# Patient Record
Sex: MALE | Race: WHITE | HISPANIC OR LATINO | URBAN - METROPOLITAN AREA
[De-identification: names, ages, dates, MRNs, and addresses within clinical notes are randomized per-mention and may not be internally consistent; named-entity substitution may affect disease eponyms.]

---

## 2020-01-01 ENCOUNTER — APPOINTMENT (OUTPATIENT)
Dept: RADIOLOGY | Facility: MEDICAL CENTER | Age: 50
DRG: 871 | End: 2020-01-01
Attending: EMERGENCY MEDICINE

## 2020-01-01 ENCOUNTER — APPOINTMENT (OUTPATIENT)
Dept: RADIOLOGY | Facility: MEDICAL CENTER | Age: 50
DRG: 871 | End: 2020-01-01
Attending: INTERNAL MEDICINE

## 2020-01-01 ENCOUNTER — APPOINTMENT (OUTPATIENT)
Dept: RADIOLOGY | Facility: MEDICAL CENTER | Age: 50
DRG: 871 | End: 2020-01-01
Attending: EMERGENCY MEDICINE
Payer: COMMERCIAL

## 2020-01-01 ENCOUNTER — HOSPITAL ENCOUNTER (INPATIENT)
Facility: MEDICAL CENTER | Age: 50
LOS: 1 days | DRG: 871 | End: 2020-11-28
Attending: EMERGENCY MEDICINE | Admitting: INTERNAL MEDICINE
Payer: COMMERCIAL

## 2020-01-01 VITALS
DIASTOLIC BLOOD PRESSURE: 88 MMHG | HEART RATE: 54 BPM | HEIGHT: 65 IN | WEIGHT: 180 LBS | SYSTOLIC BLOOD PRESSURE: 127 MMHG | RESPIRATION RATE: 2 BRPM | TEMPERATURE: 98 F | BODY MASS INDEX: 29.99 KG/M2 | OXYGEN SATURATION: 100 %

## 2020-01-01 DIAGNOSIS — I62.9 INTRACRANIAL HEMORRHAGE (HCC): ICD-10-CM

## 2020-01-01 DIAGNOSIS — J12.82 PNEUMONIA DUE TO COVID-19 VIRUS: ICD-10-CM

## 2020-01-01 DIAGNOSIS — U07.1 PNEUMONIA DUE TO COVID-19 VIRUS: ICD-10-CM

## 2020-01-01 LAB
ACTION RANGE TRIGGERED IACRT: YES
ALBUMIN SERPL BCP-MCNC: 4.6 G/DL (ref 3.2–4.9)
ALBUMIN/GLOB SERPL: 1.4 G/DL
ALP SERPL-CCNC: 111 U/L (ref 30–99)
ALT SERPL-CCNC: 28 U/L (ref 2–50)
AMPHET UR QL SCN: NEGATIVE
ANION GAP SERPL CALC-SCNC: 13 MMOL/L (ref 7–16)
ANION GAP SERPL CALC-SCNC: 8 MMOL/L (ref 7–16)
APAP SERPL-MCNC: <5 UG/ML (ref 10–30)
APPEARANCE UR: CLEAR
APTT PPP: 25.4 SEC (ref 24.7–36)
AST SERPL-CCNC: 37 U/L (ref 12–45)
BACTERIA #/AREA URNS HPF: NEGATIVE /HPF
BARBITURATES UR QL SCN: NEGATIVE
BASE EXCESS BLDA CALC-SCNC: -3 MMOL/L (ref -4–3)
BASOPHILS # BLD AUTO: 0.3 % (ref 0–1.8)
BASOPHILS # BLD: 0.04 K/UL (ref 0–0.12)
BENZODIAZ UR QL SCN: NEGATIVE
BILIRUB SERPL-MCNC: 0.5 MG/DL (ref 0.1–1.5)
BILIRUB UR QL STRIP.AUTO: NEGATIVE
BODY TEMPERATURE: ABNORMAL DEGREES
BUN SERPL-MCNC: 14 MG/DL (ref 8–22)
BUN SERPL-MCNC: 14 MG/DL (ref 8–22)
BZE UR QL SCN: NEGATIVE
CALCIUM SERPL-MCNC: 8.7 MG/DL (ref 8.5–10.5)
CALCIUM SERPL-MCNC: 9.2 MG/DL (ref 8.5–10.5)
CANNABINOIDS UR QL SCN: NEGATIVE
CFT BLD TEG: 2.6 MIN (ref 5–10)
CHLORIDE SERPL-SCNC: 102 MMOL/L (ref 96–112)
CHLORIDE SERPL-SCNC: 104 MMOL/L (ref 96–112)
CK SERPL-CCNC: 141 U/L (ref 0–154)
CLOT ANGLE BLD TEG: 67.9 DEGREES (ref 53–72)
CLOT LYSIS 30M P MA LENFR BLD TEG: 0 % (ref 0–8)
CO2 BLDA-SCNC: 28 MMOL/L (ref 20–33)
CO2 SERPL-SCNC: 23 MMOL/L (ref 20–33)
CO2 SERPL-SCNC: 25 MMOL/L (ref 20–33)
COLOR UR: YELLOW
COVID ORDER STATUS COVID19: NORMAL
CREAT SERPL-MCNC: 0.99 MG/DL (ref 0.5–1.4)
CREAT SERPL-MCNC: 1.04 MG/DL (ref 0.5–1.4)
CRP SERPL HS-MCNC: 0.5 MG/DL (ref 0–0.75)
CT.EXTRINSIC BLD ROTEM: 2.2 MIN (ref 1–3)
D DIMER PPP IA.FEU-MCNC: 0.67 UG/ML (FEU) (ref 0–0.5)
EKG IMPRESSION: NORMAL
EOSINOPHIL # BLD AUTO: 0.22 K/UL (ref 0–0.51)
EOSINOPHIL NFR BLD: 1.9 % (ref 0–6.9)
EPI CELLS #/AREA URNS HPF: NEGATIVE /HPF
ERYTHROCYTE [DISTWIDTH] IN BLOOD BY AUTOMATED COUNT: 42.3 FL (ref 35.9–50)
ERYTHROCYTE [DISTWIDTH] IN BLOOD BY AUTOMATED COUNT: 43.2 FL (ref 35.9–50)
ETHANOL BLD-MCNC: <10.1 MG/DL (ref 0–10)
FLUAV RNA SPEC QL NAA+PROBE: NEGATIVE
FLUBV RNA SPEC QL NAA+PROBE: NEGATIVE
GLOBULIN SER CALC-MCNC: 3.4 G/DL (ref 1.9–3.5)
GLUCOSE SERPL-MCNC: 149 MG/DL (ref 65–99)
GLUCOSE SERPL-MCNC: 226 MG/DL (ref 65–99)
GLUCOSE UR STRIP.AUTO-MCNC: NEGATIVE MG/DL
HCO3 BLDA-SCNC: 25.8 MMOL/L (ref 17–25)
HCT VFR BLD AUTO: 44.6 % (ref 42–52)
HCT VFR BLD AUTO: 48.1 % (ref 42–52)
HGB BLD-MCNC: 14.4 G/DL (ref 14–18)
HGB BLD-MCNC: 15.7 G/DL (ref 14–18)
HOROWITZ INDEX BLDA+IHG-RTO: 205 MM[HG]
HYALINE CASTS #/AREA URNS LPF: ABNORMAL /LPF
IMM GRANULOCYTES # BLD AUTO: 0.14 K/UL (ref 0–0.11)
IMM GRANULOCYTES NFR BLD AUTO: 1.2 % (ref 0–0.9)
INR PPP: 0.96 (ref 0.87–1.13)
INST. QUALIFIED PATIENT IIQPT: YES
KETONES UR STRIP.AUTO-MCNC: NEGATIVE MG/DL
LACTATE BLD-SCNC: 1.9 MMOL/L (ref 0.5–2)
LACTATE BLD-SCNC: 3.5 MMOL/L (ref 0.5–2)
LEUKOCYTE ESTERASE UR QL STRIP.AUTO: NEGATIVE
LYMPHOCYTES # BLD AUTO: 4.01 K/UL (ref 1–4.8)
LYMPHOCYTES NFR BLD: 35 % (ref 22–41)
MAGNESIUM SERPL-MCNC: 2.2 MG/DL (ref 1.5–2.5)
MCF BLD TEG: 57.7 MM (ref 50–70)
MCH RBC QN AUTO: 30.3 PG (ref 27–33)
MCH RBC QN AUTO: 31.2 PG (ref 27–33)
MCHC RBC AUTO-ENTMCNC: 32.3 G/DL (ref 33.7–35.3)
MCHC RBC AUTO-ENTMCNC: 32.6 G/DL (ref 33.7–35.3)
MCV RBC AUTO: 93.9 FL (ref 81.4–97.8)
MCV RBC AUTO: 95.6 FL (ref 81.4–97.8)
METHADONE UR QL SCN: NEGATIVE
MICRO URNS: ABNORMAL
MONOCYTES # BLD AUTO: 0.52 K/UL (ref 0–0.85)
MONOCYTES NFR BLD AUTO: 4.5 % (ref 0–13.4)
NEUTROPHILS # BLD AUTO: 6.53 K/UL (ref 1.82–7.42)
NEUTROPHILS NFR BLD: 57.1 % (ref 44–72)
NITRITE UR QL STRIP.AUTO: NEGATIVE
NRBC # BLD AUTO: 0 K/UL
NRBC BLD-RTO: 0 /100 WBC
NT-PROBNP SERPL IA-MCNC: 59 PG/ML (ref 0–125)
O2/TOTAL GAS SETTING VFR VENT: 100 %
OPIATES UR QL SCN: NEGATIVE
OXYCODONE UR QL SCN: NEGATIVE
PCO2 BLDA: 62.9 MMHG (ref 26–37)
PCP UR QL SCN: NEGATIVE
PH BLDA: 7.22 [PH] (ref 7.4–7.5)
PH UR STRIP.AUTO: 7.5 [PH] (ref 5–8)
PLATELET # BLD AUTO: 262 K/UL (ref 164–446)
PLATELET # BLD AUTO: 266 K/UL (ref 164–446)
PMV BLD AUTO: 10.4 FL (ref 9–12.9)
PMV BLD AUTO: 9.5 FL (ref 9–12.9)
PO2 BLDA: 205 MMHG (ref 64–87)
POTASSIUM SERPL-SCNC: 2.9 MMOL/L (ref 3.6–5.5)
POTASSIUM SERPL-SCNC: 4.4 MMOL/L (ref 3.6–5.5)
POTASSIUM SERPL-SCNC: 5.9 MMOL/L (ref 3.6–5.5)
PROCALCITONIN SERPL-MCNC: <0.05 NG/ML
PROPOXYPH UR QL SCN: NEGATIVE
PROT SERPL-MCNC: 8 G/DL (ref 6–8.2)
PROT UR QL STRIP: 30 MG/DL
PROTHROMBIN TIME: 13.1 SEC (ref 12–14.6)
RBC # BLD AUTO: 4.75 M/UL (ref 4.7–6.1)
RBC # BLD AUTO: 5.03 M/UL (ref 4.7–6.1)
RBC # URNS HPF: ABNORMAL /HPF
RBC UR QL AUTO: ABNORMAL
RSV RNA SPEC QL NAA+PROBE: NEGATIVE
SALICYLATES SERPL-MCNC: <1 MG/DL (ref 15–25)
SAO2 % BLDA: 100 % (ref 93–99)
SARS-COV-2 RNA RESP QL NAA+PROBE: DETECTED
SODIUM SERPL-SCNC: 137 MMOL/L (ref 135–145)
SODIUM SERPL-SCNC: 138 MMOL/L (ref 135–145)
SP GR UR STRIP.AUTO: 1.01
SPECIMEN DRAWN FROM PATIENT: ABNORMAL
SPECIMEN SOURCE: ABNORMAL
T4 FREE SERPL-MCNC: 1.15 NG/DL (ref 0.93–1.7)
TEG ALGORITHM TGALG: ABNORMAL
TRIGL SERPL-MCNC: 212 MG/DL (ref 0–149)
TROPONIN T SERPL-MCNC: 8 NG/L (ref 6–19)
TSH SERPL DL<=0.005 MIU/L-ACNC: 3.5 UIU/ML (ref 0.38–5.33)
UROBILINOGEN UR STRIP.AUTO-MCNC: 0.2 MG/DL
WBC # BLD AUTO: 11.5 K/UL (ref 4.8–10.8)
WBC # BLD AUTO: 14.8 K/UL (ref 4.8–10.8)
WBC #/AREA URNS HPF: ABNORMAL /HPF

## 2020-01-01 PROCEDURE — 80048 BASIC METABOLIC PNL TOTAL CA: CPT

## 2020-01-01 PROCEDURE — 5A1935Z RESPIRATORY VENTILATION, LESS THAN 24 CONSECUTIVE HOURS: ICD-10-PCS | Performed by: EMERGENCY MEDICINE

## 2020-01-01 PROCEDURE — 85025 COMPLETE CBC W/AUTO DIFF WBC: CPT

## 2020-01-01 PROCEDURE — 84443 ASSAY THYROID STIM HORMONE: CPT

## 2020-01-01 PROCEDURE — 85576 BLOOD PLATELET AGGREGATION: CPT

## 2020-01-01 PROCEDURE — 700111 HCHG RX REV CODE 636 W/ 250 OVERRIDE (IP): Performed by: INTERNAL MEDICINE

## 2020-01-01 PROCEDURE — 84478 ASSAY OF TRIGLYCERIDES: CPT

## 2020-01-01 PROCEDURE — 96365 THER/PROPH/DIAG IV INF INIT: CPT

## 2020-01-01 PROCEDURE — 85730 THROMBOPLASTIN TIME PARTIAL: CPT

## 2020-01-01 PROCEDURE — 87150 DNA/RNA AMPLIFIED PROBE: CPT | Mod: 91

## 2020-01-01 PROCEDURE — 99292 CRITICAL CARE ADDL 30 MIN: CPT | Performed by: INTERNAL MEDICINE

## 2020-01-01 PROCEDURE — 302140 GU CART

## 2020-01-01 PROCEDURE — 86140 C-REACTIVE PROTEIN: CPT

## 2020-01-01 PROCEDURE — 96366 THER/PROPH/DIAG IV INF ADDON: CPT

## 2020-01-01 PROCEDURE — 80053 COMPREHEN METABOLIC PANEL: CPT

## 2020-01-01 PROCEDURE — 96367 TX/PROPH/DG ADDL SEQ IV INF: CPT

## 2020-01-01 PROCEDURE — 84132 ASSAY OF SERUM POTASSIUM: CPT

## 2020-01-01 PROCEDURE — 96375 TX/PRO/DX INJ NEW DRUG ADDON: CPT

## 2020-01-01 PROCEDURE — 81001 URINALYSIS AUTO W/SCOPE: CPT

## 2020-01-01 PROCEDURE — 87040 BLOOD CULTURE FOR BACTERIA: CPT | Mod: 91

## 2020-01-01 PROCEDURE — 83735 ASSAY OF MAGNESIUM: CPT

## 2020-01-01 PROCEDURE — 770022 HCHG ROOM/CARE - ICU (200)

## 2020-01-01 PROCEDURE — 94002 VENT MGMT INPAT INIT DAY: CPT

## 2020-01-01 PROCEDURE — 85347 COAGULATION TIME ACTIVATED: CPT

## 2020-01-01 PROCEDURE — 87086 URINE CULTURE/COLONY COUNT: CPT

## 2020-01-01 PROCEDURE — 700111 HCHG RX REV CODE 636 W/ 250 OVERRIDE (IP): Performed by: EMERGENCY MEDICINE

## 2020-01-01 PROCEDURE — 94760 N-INVAS EAR/PLS OXIMETRY 1: CPT

## 2020-01-01 PROCEDURE — 87077 CULTURE AEROBIC IDENTIFY: CPT

## 2020-01-01 PROCEDURE — 93005 ELECTROCARDIOGRAM TRACING: CPT | Performed by: EMERGENCY MEDICINE

## 2020-01-01 PROCEDURE — 82803 BLOOD GASES ANY COMBINATION: CPT

## 2020-01-01 PROCEDURE — 80307 DRUG TEST PRSMV CHEM ANLYZR: CPT

## 2020-01-01 PROCEDURE — 83880 ASSAY OF NATRIURETIC PEPTIDE: CPT

## 2020-01-01 PROCEDURE — 96368 THER/DIAG CONCURRENT INF: CPT

## 2020-01-01 PROCEDURE — 99291 CRITICAL CARE FIRST HOUR: CPT

## 2020-01-01 PROCEDURE — 84484 ASSAY OF TROPONIN QUANT: CPT

## 2020-01-01 PROCEDURE — 84145 PROCALCITONIN (PCT): CPT

## 2020-01-01 PROCEDURE — 51102 DRAIN BL W/CATH INSERTION: CPT

## 2020-01-01 PROCEDURE — 51798 US URINE CAPACITY MEASURE: CPT

## 2020-01-01 PROCEDURE — 0T9B30Z DRAINAGE OF BLADDER WITH DRAINAGE DEVICE, PERCUTANEOUS APPROACH: ICD-10-PCS | Performed by: UROLOGY

## 2020-01-01 PROCEDURE — 99291 CRITICAL CARE FIRST HOUR: CPT | Performed by: INTERNAL MEDICINE

## 2020-01-01 PROCEDURE — 82550 ASSAY OF CK (CPK): CPT

## 2020-01-01 PROCEDURE — 0BH17EZ INSERTION OF ENDOTRACHEAL AIRWAY INTO TRACHEA, VIA NATURAL OR ARTIFICIAL OPENING: ICD-10-PCS | Performed by: EMERGENCY MEDICINE

## 2020-01-01 PROCEDURE — 94003 VENT MGMT INPAT SUBQ DAY: CPT

## 2020-01-01 PROCEDURE — 87186 SC STD MICRODIL/AGAR DIL: CPT

## 2020-01-01 PROCEDURE — 0TJ98ZZ INSPECTION OF URETER, VIA NATURAL OR ARTIFICIAL OPENING ENDOSCOPIC: ICD-10-PCS | Performed by: UROLOGY

## 2020-01-01 PROCEDURE — 85384 FIBRINOGEN ACTIVITY: CPT

## 2020-01-01 PROCEDURE — 700105 HCHG RX REV CODE 258: Performed by: EMERGENCY MEDICINE

## 2020-01-01 PROCEDURE — 0241U HCHG SARS-COV-2 COVID-19 NFCT DS RESP RNA 4 TRGT MIC: CPT

## 2020-01-01 PROCEDURE — 85379 FIBRIN DEGRADATION QUANT: CPT

## 2020-01-01 PROCEDURE — 70450 CT HEAD/BRAIN W/O DYE: CPT

## 2020-01-01 PROCEDURE — 85610 PROTHROMBIN TIME: CPT

## 2020-01-01 PROCEDURE — 700101 HCHG RX REV CODE 250: Performed by: EMERGENCY MEDICINE

## 2020-01-01 PROCEDURE — 83605 ASSAY OF LACTIC ACID: CPT | Mod: 91

## 2020-01-01 PROCEDURE — 31500 INSERT EMERGENCY AIRWAY: CPT

## 2020-01-01 PROCEDURE — 304538 HCHG NG TUBE

## 2020-01-01 PROCEDURE — 71045 X-RAY EXAM CHEST 1 VIEW: CPT

## 2020-01-01 PROCEDURE — 84439 ASSAY OF FREE THYROXINE: CPT

## 2020-01-01 PROCEDURE — 85027 COMPLETE CBC AUTOMATED: CPT

## 2020-01-01 RX ORDER — SODIUM CHLORIDE, SODIUM LACTATE, POTASSIUM CHLORIDE, CALCIUM CHLORIDE 600; 310; 30; 20 MG/100ML; MG/100ML; MG/100ML; MG/100ML
1000 INJECTION, SOLUTION INTRAVENOUS ONCE
Status: COMPLETED | OUTPATIENT
Start: 2020-01-01 | End: 2020-01-01

## 2020-01-01 RX ORDER — FAMOTIDINE 20 MG/1
20 TABLET, FILM COATED ORAL EVERY 12 HOURS
Status: DISCONTINUED | OUTPATIENT
Start: 2020-01-01 | End: 2020-01-01

## 2020-01-01 RX ORDER — ACETAMINOPHEN 325 MG/1
650 TABLET ORAL EVERY 6 HOURS PRN
Status: DISCONTINUED | OUTPATIENT
Start: 2020-01-01 | End: 2020-01-01

## 2020-01-01 RX ORDER — IPRATROPIUM BROMIDE AND ALBUTEROL SULFATE 2.5; .5 MG/3ML; MG/3ML
3 SOLUTION RESPIRATORY (INHALATION)
Status: DISCONTINUED | OUTPATIENT
Start: 2020-01-01 | End: 2020-01-01

## 2020-01-01 RX ORDER — ONDANSETRON 4 MG/1
4 TABLET, ORALLY DISINTEGRATING ORAL EVERY 6 HOURS PRN
Status: DISCONTINUED | OUTPATIENT
Start: 2020-01-01 | End: 2020-01-01

## 2020-01-01 RX ORDER — HYDROMORPHONE HYDROCHLORIDE 2 MG/ML
4 INJECTION, SOLUTION INTRAMUSCULAR; INTRAVENOUS; SUBCUTANEOUS
Status: DISCONTINUED | OUTPATIENT
Start: 2020-01-01 | End: 2020-01-01 | Stop reason: HOSPADM

## 2020-01-01 RX ORDER — ONDANSETRON 2 MG/ML
4 INJECTION INTRAMUSCULAR; INTRAVENOUS EVERY 6 HOURS PRN
Status: DISCONTINUED | OUTPATIENT
Start: 2020-01-01 | End: 2020-01-01

## 2020-01-01 RX ORDER — FUROSEMIDE 10 MG/ML
40 INJECTION INTRAMUSCULAR; INTRAVENOUS
Status: DISCONTINUED | OUTPATIENT
Start: 2020-01-01 | End: 2020-01-01

## 2020-01-01 RX ORDER — LORAZEPAM 2 MG/ML
1 CONCENTRATE ORAL
Status: DISCONTINUED | OUTPATIENT
Start: 2020-01-01 | End: 2020-01-01 | Stop reason: HOSPADM

## 2020-01-01 RX ORDER — ATROPINE SULFATE 10 MG/ML
2 SOLUTION/ DROPS OPHTHALMIC EVERY 4 HOURS PRN
Status: DISCONTINUED | OUTPATIENT
Start: 2020-01-01 | End: 2020-01-01 | Stop reason: HOSPADM

## 2020-01-01 RX ORDER — LORAZEPAM 2 MG/ML
1 INJECTION INTRAMUSCULAR
Status: DISCONTINUED | OUTPATIENT
Start: 2020-01-01 | End: 2020-01-01 | Stop reason: HOSPADM

## 2020-01-01 RX ORDER — AMOXICILLIN 250 MG
2 CAPSULE ORAL 2 TIMES DAILY
Status: DISCONTINUED | OUTPATIENT
Start: 2020-01-01 | End: 2020-01-01

## 2020-01-01 RX ORDER — SODIUM CHLORIDE, SODIUM LACTATE, POTASSIUM CHLORIDE, CALCIUM CHLORIDE 600; 310; 30; 20 MG/100ML; MG/100ML; MG/100ML; MG/100ML
1000 INJECTION, SOLUTION INTRAVENOUS ONCE
Status: DISCONTINUED | OUTPATIENT
Start: 2020-01-01 | End: 2020-01-01

## 2020-01-01 RX ORDER — KETAMINE HYDROCHLORIDE 50 MG/ML
INJECTION, SOLUTION INTRAMUSCULAR; INTRAVENOUS
Status: COMPLETED | OUTPATIENT
Start: 2020-01-01 | End: 2020-01-01

## 2020-01-01 RX ORDER — POTASSIUM CHLORIDE 7.45 MG/ML
10 INJECTION INTRAVENOUS
Status: COMPLETED | OUTPATIENT
Start: 2020-01-01 | End: 2020-01-01

## 2020-01-01 RX ORDER — DEXTROSE MONOHYDRATE 25 G/50ML
50 INJECTION, SOLUTION INTRAVENOUS
Status: DISCONTINUED | OUTPATIENT
Start: 2020-01-01 | End: 2020-01-01

## 2020-01-01 RX ORDER — BISACODYL 10 MG
10 SUPPOSITORY, RECTAL RECTAL
Status: DISCONTINUED | OUTPATIENT
Start: 2020-01-01 | End: 2020-01-01

## 2020-01-01 RX ORDER — POLYETHYLENE GLYCOL 3350 17 G/17G
1 POWDER, FOR SOLUTION ORAL
Status: DISCONTINUED | OUTPATIENT
Start: 2020-01-01 | End: 2020-01-01

## 2020-01-01 RX ORDER — LABETALOL HYDROCHLORIDE 5 MG/ML
10 INJECTION, SOLUTION INTRAVENOUS EVERY 4 HOURS PRN
Status: DISCONTINUED | OUTPATIENT
Start: 2020-01-01 | End: 2020-01-01

## 2020-01-01 RX ORDER — HYDROMORPHONE HYDROCHLORIDE 2 MG/ML
2 INJECTION, SOLUTION INTRAMUSCULAR; INTRAVENOUS; SUBCUTANEOUS
Status: DISCONTINUED | OUTPATIENT
Start: 2020-01-01 | End: 2020-01-01 | Stop reason: HOSPADM

## 2020-01-01 RX ORDER — ROCURONIUM BROMIDE 10 MG/ML
INJECTION, SOLUTION INTRAVENOUS
Status: COMPLETED | OUTPATIENT
Start: 2020-01-01 | End: 2020-01-01

## 2020-01-01 RX ORDER — DEXAMETHASONE 6 MG/1
6 TABLET ORAL EVERY EVENING
Status: DISCONTINUED | OUTPATIENT
Start: 2020-01-01 | End: 2020-01-01

## 2020-01-01 RX ADMIN — PROPOFOL 25 MCG/KG/MIN: 10 INJECTION, EMULSION INTRAVENOUS at 19:29

## 2020-01-01 RX ADMIN — PROPOFOL 25 MCG/KG/MIN: 10 INJECTION, EMULSION INTRAVENOUS at 15:15

## 2020-01-01 RX ADMIN — FUROSEMIDE 40 MG: 10 INJECTION, SOLUTION INTRAMUSCULAR; INTRAVENOUS at 19:31

## 2020-01-01 RX ADMIN — SODIUM CHLORIDE, POTASSIUM CHLORIDE, SODIUM LACTATE AND CALCIUM CHLORIDE 1000 ML: 600; 310; 30; 20 INJECTION, SOLUTION INTRAVENOUS at 14:35

## 2020-01-01 RX ADMIN — FENTANYL CITRATE 100 MCG: 50 INJECTION, SOLUTION INTRAMUSCULAR; INTRAVENOUS at 16:42

## 2020-01-01 RX ADMIN — ROCURONIUM BROMIDE 100 MG: 10 INJECTION, SOLUTION INTRAVENOUS at 14:16

## 2020-01-01 RX ADMIN — POTASSIUM CHLORIDE 10 MEQ: 7.46 INJECTION, SOLUTION INTRAVENOUS at 19:27

## 2020-01-01 RX ADMIN — FENTANYL CITRATE 100 MCG: 50 INJECTION, SOLUTION INTRAMUSCULAR; INTRAVENOUS at 18:27

## 2020-01-01 RX ADMIN — KETAMINE HYDROCHLORIDE 100 MG: 50 INJECTION INTRAMUSCULAR; INTRAVENOUS at 14:16

## 2020-01-01 RX ADMIN — FAMOTIDINE 20 MG: 10 INJECTION INTRAVENOUS at 17:33

## 2020-01-01 RX ADMIN — POTASSIUM CHLORIDE 10 MEQ: 7.46 INJECTION, SOLUTION INTRAVENOUS at 18:29

## 2020-01-01 RX ADMIN — CEFTRIAXONE SODIUM 2 G: 2 INJECTION, POWDER, FOR SOLUTION INTRAMUSCULAR; INTRAVENOUS at 17:33

## 2020-01-01 RX ADMIN — POTASSIUM CHLORIDE 10 MEQ: 7.46 INJECTION, SOLUTION INTRAVENOUS at 17:35

## 2020-01-01 RX ADMIN — POTASSIUM CHLORIDE 10 MEQ: 7.46 INJECTION, SOLUTION INTRAVENOUS at 20:42

## 2020-01-01 RX ADMIN — PROPOFOL 25 MCG/KG/MIN: 10 INJECTION, EMULSION INTRAVENOUS at 14:35

## 2020-11-27 PROBLEM — I62.9 INTRACRANIAL HEMORRHAGE (HCC): Status: ACTIVE | Noted: 2020-01-01

## 2020-11-27 PROBLEM — U07.1 PNEUMONIA DUE TO COVID-19 VIRUS: Status: ACTIVE | Noted: 2020-01-01

## 2020-11-27 PROBLEM — R31.9 HEMATURIA: Status: ACTIVE | Noted: 2020-01-01

## 2020-11-27 PROBLEM — R11.10 VOMITING: Status: ACTIVE | Noted: 2020-01-01

## 2020-11-27 PROBLEM — T17.908A ASPIRATION INTO AIRWAY: Status: ACTIVE | Noted: 2020-01-01

## 2020-11-27 PROBLEM — R41.82 ALTERED MENTAL STATUS: Status: ACTIVE | Noted: 2020-01-01

## 2020-11-27 PROBLEM — A41.9 SEPSIS (HCC): Status: ACTIVE | Noted: 2020-01-01

## 2020-11-27 PROBLEM — J96.01 ACUTE RESPIRATORY FAILURE WITH HYPOXIA (HCC): Status: ACTIVE | Noted: 2020-01-01

## 2020-11-27 PROBLEM — E87.6 HYPOKALEMIA: Status: ACTIVE | Noted: 2020-01-01

## 2020-11-27 PROBLEM — J12.82 PNEUMONIA DUE TO COVID-19 VIRUS: Status: ACTIVE | Noted: 2020-01-01

## 2020-11-27 NOTE — ASSESSMENT & PLAN NOTE
Required intubation for respiratory failure  Pressors for map > 65 and sbp > 90  Pneumonia vs abdominal source  Antibiotics  Pending infectious work up, pending ua

## 2020-11-27 NOTE — ASSESSMENT & PLAN NOTE
Aspiration, likely from vomiting  On ventilator  Wbc 11.5  No significant consolidation on xray outside of expected for aspiration  Antibiotics if fever or significant elevated procalcitonin or other signs of infection

## 2020-11-27 NOTE — ED PROVIDER NOTES
"ED Provider Note    CHIEF COMPLAINT  Chief Complaint   Patient presents with   • Apnea       HPI  Volens Magnus is a 89 y.o. adult who presents by EMS after he was found down with sonorous respirations by his stepson at home.  Uncertain duration of downtime.  Upon arrival by EMS, bag-valve-mask respirations were in progress.  Patient was tolerating an OPA.  He appeared cyanotic to the face and neck.  Patient had active emesis as well.  Unable to obtain a history from the patient due to critical condition.    REVIEW OF SYSTEMS  See HPI for further details.  Limited secondary to critical condition.    PAST MEDICAL HISTORY   Unknown past medical history.    SOCIAL HISTORY  Social History     Tobacco Use   • Smoking status: Not on file   Substance and Sexual Activity   • Alcohol use: Not on file   • Drug use: Not on file   • Sexual activity: Not on file       SURGICAL HISTORY  patient denies any surgical history    CURRENT MEDICATIONS  Home Medications     Reviewed by Zoran Vasquez (Pharmacy Tech) on 11/27/20 at 3894  Med List Status: Unable to Obtain   Medication Last Dose Status        Patient Jesús Taking any Medications                       ALLERGIES  Not on File    PHYSICAL EXAM  VITAL SIGNS: BP (!) 175/129   Pulse 175   Temp 36.7 °C (98 °F) (Tympanic)   Ht 1.651 m (5' 5\")   Wt 81.6 kg (180 lb)   SpO2 98%   BMI 29.95 kg/m²   Pulse ox interpretation: I interpret this pulse ox as normal.  Constitutional: Critical condition  HENT: No obvious signs of trauma, Bilateral external ears normal, Nose normal.   Eyes: Pupils are equal and reactive, 2 mm, conjunctiva normal, Non-icteric.   Neck: Normal range of motion, No tenderness, Supple, No stridor.   Cardiovascular: Tachycardic rate and regular rhythm.   Thorax & Lungs: Normal breath sounds, No respiratory distress, No wheezing, No chest tenderness.   Abdomen: Bowel sounds normal, Soft, No tenderness, No masses, No pulsatile masses. No peritoneal " signs.  Skin: Warm, Dry, No erythema, No rash.  Cyanosis involving the face and neck  Back: No bony tenderness, No CVA tenderness.   Extremities: Intact distal pulses, No edema, No tenderness, No cyanosis  Musculoskeletal: Good range of motion in all major joints. No tenderness to palpation or major deformities noted.   Neurologic: Unresponsive, no spontaneous movements, not protecting his airway, no gag with OPA in place, active vomiting, No focal deficits noted.       DIAGNOSTIC STUDIES / PROCEDURES    EKG - Physician interpretation  Results for orders placed or performed during the hospital encounter of 20   EKG   Result Value Ref Range    Report       [facility] Emergency Dept.    Test Date:  2020  Pt Name:    VOLENS TWENTY-TWO            Department: ER  MRN:        [id number]                      Room:       [facility]  Gender:                                  Technician: [id number]  :                                     Requested By:MAY WOODALL  Order #:    [id number]                    Reading MD: MAY WOODALL MD    Measurements  Intervals                                Axis  Rate:       129                          P:          48  WA:         112                          QRS:        61  QRSD:       98                           T:          228  QT:         332  QTc:        487    Interpretive Statements  SINUS TACHYCARDIA  PROBABLE LEFT ATRIAL ABNORMALITY  REPOLARIZATION ABNORMALITY, PROB RATE RELATED  BORDERLINE PROLONGED QT INTERVAL  No previous ECG available for comparison  Electronically Signed On 2020 14:33:24 PST by MAY WOODALL MD           LABS  Labs Reviewed   TRIGLYCERIDE - Abnormal; Notable for the following components:       Result Value    Triglycerides 212 (*)     All other components within normal limits    Narrative:     While on propofol   LACTIC ACID - Abnormal; Notable for the following components:    Lactic Acid 3.5 (*)     All other components within normal limits    CBC WITH DIFFERENTIAL - Abnormal; Notable for the following components:    WBC 11.5 (*)     MCHC 32.6 (*)     Immature Granulocytes 1.20 (*)     Immature Granulocytes (abs) 0.14 (*)     All other components within normal limits    Narrative:     While on propofol   COMP METABOLIC PANEL - Abnormal; Notable for the following components:    Potassium 2.9 (*)     Glucose 226 (*)     Alkaline Phosphatase 111 (*)     All other components within normal limits    Narrative:     While on propofol   URINALYSIS - Abnormal; Notable for the following components:    Protein 30 (*)     Occult Blood Moderate (*)     All other components within normal limits    Narrative:     Indication for culture:->Emergency Room Patient   SALICYLATE - Abnormal; Notable for the following components:    Salicylates, Quant. <1 (*)     All other components within normal limits   ACETAMINOPHEN - Abnormal; Notable for the following components:    Acetaminophen -Tylenol <5 (*)     All other components within normal limits   COV-2, FLU A/B, AND RSV BY PCR - Abnormal; Notable for the following components:    SARS-CoV-2 by PCR DETECTED (*)     All other components within normal limits    Narrative:     Is patient being admitted?->Yes  Does this patient meet criteria for Rush/Cepheid per [facility]  Inpatient Workflow? (See workflow link below)->Yes  Expected turn around time?->Rush (Cepheid 2-4 hours)  Have you been in close contact with a person who is suspected  or known to be positive for COVID-19 within the last 30 days  (e.g. last seen that person < 30 days ago)->Unknown   D-DIMER - Abnormal; Notable for the following components:    D-Dimer Screen 0.67 (*)     All other components within normal limits    Narrative:     Droplet, Contact, and Eye Protection   URINE MICROSCOPIC (W/UA) - Abnormal; Notable for the following components:    WBC 0-2 (*)     -150 (*)     All other components within normal limits    Narrative:     Indication for  "culture:->Emergency Room Patient   BASIC TEG - Abnormal; Notable for the following components:    Reaction Time Initial-R 2.6 (*)     All other components within normal limits    Narrative:     Droplet, Contact, and Eye Protection  Is the patient on heparin (including low molecular weight  heparin, subcutaneous heparin, or lovenox)?->No  Do you want to extend TEG graph to LY30? (If no, graph will  terminate at MA)->No   BASIC METABOLIC PANEL - Abnormal; Notable for the following components:    Potassium 5.9 (*)     Glucose 149 (*)     All other components within normal limits    Narrative:     Droplet, Contact, and Eye Protection   CBC WITHOUT DIFFERENTIAL - Abnormal; Notable for the following components:    WBC 14.8 (*)     MCHC 32.3 (*)     All other components within normal limits    Narrative:     Droplet, Contact, and Eye Protection   ISTAT ARTERIAL BLOOD GAS - Abnormal; Notable for the following components:    Ph 7.221 (*)     Pco2 62.9 (*)     Po2 205 (*)     S02 100 (*)     Hco3 25.8 (*)     All other components within normal limits   LACTIC ACID   URINE CULTURE(NEW)    Narrative:     Indication for culture:->Emergency Room Patient   BLOOD CULTURE    Narrative:     Per Hospital Policy: Only change Specimen Src: to \"Line\" if  specified by physician order.  Left AC   BLOOD CULTURE    Narrative:     Per Hospital Policy: Only change Specimen Src: to \"Line\" if  specified by physician order.  Right AC   COVID/SARS COV-2    Narrative:     Is patient being admitted?->Yes  Does this patient meet criteria for Rush/Cepheid per [facility]  Inpatient Workflow? (See workflow link below)->Yes  Expected turn around time?->Rush (Cepheid 2-4 hours)  Have you been in close contact with a person who is suspected  or known to be positive for COVID-19 within the last 30 days  (e.g. last seen that person < 30 days ago)->Unknown   TROPONIN    Narrative:     While on propofol   DIAGNOSTIC ALCOHOL    Narrative:     While on propofol "   URINE DRUG SCREEN    Narrative:     Indication for culture:->Emergency Room Patient   TSH    Narrative:     While on propofol   MAGNESIUM    Narrative:     While on propofol   PROTHROMBIN TIME    Narrative:     Indicate which anticoagulants the patient is on:->NONE   APTT    Narrative:     Indicate which anticoagulants the patient is on:->NONE   FREE THYROXINE    Narrative:     While on propofol   PROCALCITONIN    Narrative:     While on propofol   PROBRAIN NATRIURETIC PEPTIDE, NT    Narrative:     While on propofol   ESTIMATED GFR    Narrative:     While on propofol   POTASSIUM SERUM (K)   CREATINE KINASE    Narrative:     Droplet, Contact, and Eye Protection   CRP QUANTITIVE (NON-CARDIAC)    Narrative:     Droplet, Contact, and Eye Protection   ESTIMATED GFR    Narrative:     Droplet, Contact, and Eye Protection         RADIOLOGY  CT-HEAD W/O   Final Result      1.  Cerebellar hemorrhage with extension into the fourth, third, and lateral ventricles developing hydrocephalus.      2.  Mildly effaced sulci are suggestive of more diffuse cerebral edema            Comment: Results discussed with Dr. Schmidt at approximately 8:18 PM         DX-CHEST-PORTABLE (1 VIEW)   Final Result         Intubated.      Diffuse interstitial prominence could relate to mild fluid overload or atypical infection.          Intubation Procedure Note    Indication: Respiratory failure, airway compromise, comatose state and airway protection    Consent: Unable to be obtained due to patient's condition.    Medications Used: ketamine intravenously and rocuronium intravenously    Procedure: The patient was placed in the appropriate position.  Cricoid pressure was utilized.  Intubation was performed by indirect laryngoscopy using a glide scope and an 8.0 cuffed endotracheal tube.  The cuff was then inflated and the tube was secured appropriately at a distance of 23 cm to the dental ridge.  Initial confirmation of placement included bilateral  breath sounds, an end tidal CO2 detector, absence of sounds over the stomach, tube fogging, adequate chest rise, adequate pulse oximetry reading and improved skin color.  A chest x-ray to verify correct placement of the tube showed appropriate tube position.    The patient tolerated the procedure well.     Complications: None        COURSE & MEDICAL DECISION MAKING    Medications   propofol (DIPRIVAN) injection (has no administration in time range)   lactated ringers infusion (BOLUS) (has no administration in time range)   ketamine (KETALAR) 50 mg/ml injection (100 mg Intravenous Given 11/27/20 1416)   rocuronium (ZEMURON) injection (100 mg Intravenous Given 11/27/20 1416)       Pertinent Labs & Imaging studies reviewed. (See chart for details)  89 y.o. adult presenting after he was found to be unresponsive on the ground by family member.  He is here with no spontaneous movement.  No eye-opening.  No active gag reflex.  He was being ventilated with bag valve mask ventilation.  He had an episode of emesis while preparing for intubation.  He was turned to his side and airway was suctioned aggressively with Yankauer.  He was intubated using RSI following this with an 8 oh ET tube.  There was significant emesis in the airway still after suctioning which was noted during intubation.    Laboratory studies were performed and the patient was found to be positive for Covid.  Uncertain if this is contributing to the patient's altered mental status and possible hypoxia and syncopal events.  Intracranial hemorrhage is also a possibility.  Laboratory studies were performed that were largely unremarkable otherwise.  X-ray of the chest showing diffuse interstitial prominence.  In the setting of Covid positive state, likely related to Covid infection.    Dr. Schmidt from ICU was contacted after intubation for further assistance and admission.  Admission was pending while awaiting Covid testing.    During resuscitation, nursing staff  "did place a Hinton catheter after intubation.  There was marine red blood return.  Hinton was flushed though continues to have scant bloody output.  Bladder scan was performed showing a full bladder with approximately 1 L.  Hinton was removed and attempt was made to replace the Hinton with a coudé or three-way Hinton catheter.  The Hinton fed with little resistance though no urine output was obtained.  Patient continued to have bleeding from the urethral meatus.  I did contact urology and Dr. Velarde from urology who came to assist the patient.  Cystoscopy was performed and Hinton catheter could not be passed.  Patient was given a suprapubic catheter.    Patient had a CT of the head on route to the ICU for further evaluation.  He was found to have a significant cerebellar bleed.  By this point, the patient was admitted to the ICU service..    The total critical care time on this patient is 35 minutes, resuscitating patient, speaking with admitting physician, and deciphering test results. This 35 minutes is exclusive of separately billable procedures.      /88   Pulse (!) 54   Temp 36.7 °C (98 °F) (Tympanic)   Resp (!) 2   Ht 1.651 m (5' 5\")   Wt 81.6 kg (180 lb)   SpO2 100%   BMI 29.95 kg/m²       FINAL IMPRESSION  1. Intracranial hemorrhage (HCC)    2. Pneumonia due to COVID-19 virus            Electronically signed by: Jaime Stroud M.D., 11/27/2020 2:22 PM    "

## 2020-11-27 NOTE — DISCHARGE PLANNING
MSW responded to Acute Medical pt in [facility]. Pt was JANINE PERKINS from home. Pt's step-son? ([phone]) called [phone] after finding pt down. Pt's family on their way to [facility]. Pt's name is Coleman VAZQUEZ 1970.

## 2020-11-27 NOTE — ASSESSMENT & PLAN NOTE
Vomiting with secretions from et tube post intubation in er  Unclear hx  Ventilator  Propofol and fentanyl for sedation  Rt protocols, vent weaning once appropriate  covid positive

## 2020-11-27 NOTE — ED TRIAGE NOTES
Pt presented via ems, ventilated manually with BVM + OPA with concerns of possible airway obstruction. Pt found down by stepson apparently choking. Intubated by erp upon arrival, copious amounts of gastric contents suctioned from mouth at that time.   Pt cleaned, NGT + Hinton placed, pt now sedated with propofol.

## 2020-11-27 NOTE — CODE DOCUMENTATION
Patient intubated by LYNN Stroud MD with 7.5Fr ETT, 25cm at lip. Condensation in tube, etCO2 color change, bilateral breath sounds. Intubated.

## 2020-11-27 NOTE — ED NOTES
Unable to complete med rec at this time   Pt unable to participate in an interview   No pharmacy listed   No demographics

## 2020-11-27 NOTE — CONSULTS
Critical Care Consultation    Date of consult: 11/27/2020    Referring Physician  Jaime Stroud M.D.    Reason for Consultation  Found unresponsive, now on mechanical ventilator    History of Presenting Illness  89 y.o. adult who presented 11/27/2020 with being found non responsive.  On arrival to ER intubated.  Significant amount of vomit and was cyanotic.  Suctioning post intubation.  Likely aspiration.  Patient in his 60s.  EMS called by stepson.  Pending work up in ER.  Pending covid.  Per nursing appeared to have aspirated lunch, appeared to have had tacos and salsa.  No plugging noted on chest xray.  Currently well sedated and post intubation, no distress.  CT head pending.  WBC 11.5.  No significant abnormalities on chemistry other than k 2.9.  Supplemented.  Given 1 L IVF in ER, held additional fluid as covid 19 was positive.  Pending phone number for family for more information.      Addendum:  COVID 19 pneumonia, + test came back  Decadron  On ventilator, no remdesivir as on ventilator  Lasix bid ordered for now  Potassium supplement ordered  Hematuria, likely traumatic del real placement  Urology called by ER to place del real, requested ceftriaxone.    Addendum  Suprapubic placed er  CT head 5.5 cm cerebellar hemorrhage, called Dr Huong mcclellan pending, plt and inr adequate    Addendum  No intervention per neuro surgery  Non survivable  Sedation weaned off  Discussed with family, family made patient dnar.      Code Status  Full code    Review of Systems  Review of Systems   Unable to perform ROS: Intubated     Past Medical History   has no past medical history on file.    Surgical History   has no past surgical history on file.    Family History  family history is not on file.    Social History       Medications  Home Medications    **Home medications have not yet been reviewed for this encounter**       Current Facility-Administered Medications   Medication Dose Route Frequency Provider Last Rate Last Admin    • propofol (DIPRIVAN) injection  0-80 mcg/kg/min Intravenous Continuous Jaime Stroud M.D. 15 mL/hr at 11/27/20 1435 25 mcg/kg/min at 11/27/20 1435     No current outpatient medications on file.       Allergies  Not on File    Vital Signs last 24 hours  Temp:  [36.7 °C (98 °F)] 36.7 °C (98 °F)  Pulse:  [] 133  Resp:  [20-39] 20  BP: (133-175)/() 133/87  SpO2:  [97 %-99 %] 98 %    Physical Exam  Physical Exam  Vitals signs and nursing note reviewed.   Constitutional:       General: Ion Agudelo is not in acute distress.     Appearance: Ion Agudelo is ill-appearing and toxic-appearing. Ion Agudelo is not diaphoretic.   HENT:      Head: Normocephalic and atraumatic.      Right Ear: External ear normal.      Left Ear: External ear normal.      Nose: No congestion or rhinorrhea.      Mouth/Throat:      Mouth: Mucous membranes are moist.      Pharynx: Oropharynx is clear. No oropharyngeal exudate or posterior oropharyngeal erythema.   Eyes:      General: No scleral icterus.        Right eye: No discharge.         Left eye: No discharge.      Conjunctiva/sclera: Conjunctivae normal.      Pupils: Pupils are equal, round, and reactive to light.   Neck:      Musculoskeletal: Normal range of motion. No neck rigidity or muscular tenderness.   Cardiovascular:      Rate and Rhythm: Normal rate and regular rhythm.      Pulses: Normal pulses.      Heart sounds: Normal heart sounds. No murmur.   Pulmonary:      Effort: Respiratory distress present.      Breath sounds: No stridor. Rhonchi present. No wheezing or rales.      Comments: + secretions  Chest:      Chest wall: No tenderness.   Abdominal:      General: There is no distension.      Palpations: There is no mass.      Tenderness: There is no abdominal tenderness. There is no guarding.   Musculoskeletal:         General: No swelling, tenderness or deformity.      Right lower leg: No edema.      Left lower leg: No edema.   Lymphadenopathy:       Cervical: No cervical adenopathy.   Skin:     Coloration: Skin is pale. Skin is not jaundiced.      Findings: No bruising, erythema, lesion or rash.   Neurological:      Cranial Nerves: No cranial nerve deficit.      Sensory: No sensory deficit.      Motor: No weakness.      Coordination: Coordination normal.      Gait: Gait normal.      Comments: Intubated and sedated         Fluids  No intake or output data in the 24 hours ending 20 1445    Laboratory  Recent Results (from the past 48 hour(s))   EKG    Collection Time: 20  2:23 PM   Result Value Ref Range    Report       [facility] Emergency Dept.    Test Date:  2020  Pt Name:    VOLENS TWENTY-TWO            Department: ER  MRN:        [id number]                      Room:       [facility]  Gender:                                  Technician: [facility]  :                                     Requested By:MAY WOODALL  Order #:    [id number]                    Reading MD: MAY WOODALL MD    Measurements  Intervals                                Axis  Rate:       129                          P:          48  VT:         112                          QRS:        61  QRSD:       98                           T:          228  QT:         332  QTc:        487    Interpretive Statements  SINUS TACHYCARDIA  PROBABLE LEFT ATRIAL ABNORMALITY  REPOLARIZATION ABNORMALITY, PROB RATE RELATED  BORDERLINE PROLONGED QT INTERVAL  No previous ECG available for comparison  Electronically Signed On 2020 14:33:24 PST by MAY WOODALL MD         Imaging  DX-CHEST-PORTABLE (1 VIEW)    (Results Pending)   CT-HEAD W/O    (Results Pending)       Assessment/Plan  Intracranial hemorrhage (HCC)  Assessment & Plan  Large intracranial hemorrhage, 5.5 cm  Reviewed with Dr Borja neurosurgery  Non survivable  Keep sbp less than 140  Weaned off sedation   q 1 hour neuro checks    Acute respiratory failure with hypoxia (HCC)  Assessment & Plan  Vomiting with secretions  from et tube post intubation in er  Unclear hx  Ventilator  Propofol and fentanyl for sedation  Rt protocols, vent weaning once appropriate  covid positive    Aspiration into airway  Assessment & Plan  Aspiration, likely from vomiting  On ventilator  Wbc 11.5  No significant consolidation on xray outside of expected for aspiration  Antibiotics if fever or significant elevated procalcitonin or other signs of infection    Hematuria  Assessment & Plan  From del real placement  Urology called by ER to place del real  Ceftriaxone once    Pneumonia due to COVID-19 virus  Assessment & Plan  COVID pneumonia  Decadron  On ventilator, no remdesivir as only evidence for shortening duration  Lasix for diuresis    Hypokalemia  Assessment & Plan  2.9  Supplement 40 meq      Altered mental status  Assessment & Plan  Found non responsive  Pending ct head w/o      Vomiting  Assessment & Plan  Likely cause for aspiration  Ng tube to suction  Pending labs      Discussed patient condition and risk of morbidity and/or mortality with RN, RT, Pharmacy, Code status disscussed and Charge nurse / hot rounds.      The patient remains critically ill.  On mechanical ventilator.  Propofol and fentanyl drips for sedation.  COVID 19 pneumonia.  Large intracranial bleed. Nicardipine for sbp less than 140.  Every one hour neuro checks.  Critical care time = 80 minutes in directly providing and coordinating critical care and extensive data review.  No time overlap and excludes procedures.

## 2020-11-28 NOTE — PROGRESS NOTES
OPO notified of patient current condition and notified the family plans to transition to comfort care upon arrival. Referral # [id number]. Per OPO patient is not candidate due to being covid (+). OPO would like to be notified of patient time of death.

## 2020-11-28 NOTE — PROGRESS NOTES
Writer and  had discussion with wife and son. Both wife and son want to follow through with terminal extubation and to provide comfort care at this time. Condolences offered and all questions answered.

## 2020-11-28 NOTE — PROGRESS NOTES
Critical care progress note:  Discussed with family over the phone.  Non survivable intracranial hemorrhage.  Not a candidate for intervention per neurosurgery.  Family made patient DNAR over the phone.  I did discuss with son and wife at bedside on their arrival regarding goals of care and both expressed understanding of current clinical status, poor prognosis and options for code status.  Planned comfort care, orders placed and on hold until family ready.  Both understand that due to covid time at bedside is restricted of which nursing has discussed actual time with them.

## 2020-11-28 NOTE — DISCHARGE PLANNING
Medical Social Work     SRAVANTHI received a call from the RN advising SW that the pt family is at bedside and may need emotional support. The pt son and wife are at bedside, the critical care MD advised the pt family of the pts medical status.SW met with the pt family and provided emotional support. SW provided active listening and emotional support. The pt wife was very upset and tearful, the pt son is upset but advised SW that they would like to talk to the some family then they would probably make the decision to make the pt comfort care.The family consulted with other family and the pt wife and son decided to make the pt comfort care. SW continued to provided support to the pt family. The pt family said there goodbyes and ask to make the pt comfort care after they leave. The pts family left and medical staff made the pt comfort care. SRAVANTHI asked the pt family if they would like a julienne, the family stated they would like the julienne. SRAVANTHI called the julienne on call Jesus Alberto and requested him to bedside. Jesus Alberto arrived and provided prayer for the pt.     SRAVANTHI provided the family with the difficult times packet and SW contact number in case they had any question at a later time.     The pts TOD was at 0035. The pt family requested SW call and advised them when the pt dies. SRAVANTHI called and advised the pt family of the death of the pt.     Plan; SW will remain available for family support.

## 2020-11-28 NOTE — PROGRESS NOTES
Spiritual Care Note    Patient Information     Patient's Name: Coleman Peres   MRN: [id number]    YOB: 1970   Age and Gender: 50 y.o. male   Service Area: [facility]   Room (and Bed): [place] [address]   Ethnicity or Nationality:     Primary Language: English   Protestant/Spiritual preference: Non-Protestant   Place of Residence: [place]   Family/Friends/Others Present: No   Clinical Team Present: No   Medical Diagnosis(-es)/Procedure(s): Patient with asystole at 0035. Death pronounced by writer and another RN   Code Status: Comfort Care/DNR    Date of Admission: 2020   Length of Stay: 1 days        Spiritual Care Provider Information:  Name of Spiritual Care Provider: Jesus Alberto Castillo  Title of Spiritual Care Provider: Associate   Phone Number: [phone]  E-mail: [email or web address]  Total time : 10 minutes    Spiritual Screen Results:    Gen Nursing      Palliative Care      Encounter/Request Information  Encounter/Request Type   Visited With: Patient  Nature of the Visit: Initial, Call back  Continue Visiting: No  Crisis Visit: Death  Referral From/ Origin of Request: Phone    Religous Needs/Values  Protestant Needs Visit  Protestant Needs: Prayer    Spiritual Assessment     Spiritual Care Encounters    Interacton/Conversation: By request of Pt's family -  said a bedside prayer for  Pt.    Assessment: Need    Need: Seeking Spiritual Assistance and Support  Demonstrate Caring and Concern    Methods: Prayer    Plan: No Further Visits    Notes:

## 2020-11-28 NOTE — ED NOTES
Pt being transported to CT and then to [facility] with ICU RN and RT. One belongings bag being sent with pt and chart

## 2020-11-28 NOTE — ASSESSMENT & PLAN NOTE
COVID pneumonia  Decadron  On ventilator, no remdesivir as only evidence for shortening duration  Lasix for diuresis

## 2020-11-28 NOTE — ED NOTES
Ronal red blood returning from patient's indwelling del real. This RN tried flushing del real with 100cc of sterile saline. 100cc of blood returned. This RN, as well as the attending MD tried placing coude tip del real without success. Bleeding noted form meatus of penis.

## 2020-11-28 NOTE — CONSULTS
"[facility] [place] Consult/H&P Note/Procedure note    Primary Service: ED/ICU  Attending: Bar Schmidt M.D.  Patient's Name/MRN: Coleman Peres, [id number]    Admit Date:11/27/2020  Today's Date: 11/27/2020   Length of stay:  LOS: 0 days   Room #:  08/08 RED      Reason for consult/chief complaint: Urinary retention, traumatic catheterization  ID/HPI: Coleman Peres is a 50 y.o. male patient found down in respiratory distress and unresponsive. In the ER per report nursing tried multiple catheters and catheter balloon was blown up in the urethra. There was large blood per meatus and urology was called. Covid + in ED. Likely cause of respiratory distress. Almost coded in the ED per report.     Patient intubated and sedated with blood at meatus.   ROS unable to be performed.        Past Medical History:   No past medical history on file.   unknown  Past Surgical History:   No past surgical history on file.   No lower abdominal surgical scars appreciated  Family History:   No family history on file.      Social History:   Social History     Tobacco Use   • Smoking status: Not on file   Substance Use Topics   • Alcohol use: Not on file   • Drug use: Not on file      Social History     Social History Narrative   • Not on file        Allergies: he Patient has no allergy information on record.    Medications:   (Not in a hospital admission)        Review of Systems  Review of Systems   Reason unable to perform ROS: intubated.        Physical Exam  VITAL SIGNS: /62   Pulse 73   Temp 36.7 °C (98 °F) (Tympanic)   Resp (!) 26   Ht 1.651 m (5' 5\")   Wt 81.6 kg (180 lb)   SpO2 99%   BMI 29.95 kg/m²   Physical Exam   Constitutional: He is well-developed, well-nourished, and in no distress.   HENT:   Head: Normocephalic and atraumatic.   Neck: Normal range of motion.   Cardiovascular: Normal rate.   Pulmonary/Chest:   Intubated/sedated   Abdominal: Soft. Distension: supra-pubic distended bladder. There " is abdominal tenderness.   Genitourinary: Discharge (bloody) found.    Genitourinary Comments: Uncirc, blood at meatus     Musculoskeletal:         General: No edema.   Neurological:   Intubated/sedated   Skin: Skin is warm and dry.   Vitals reviewed.        Labs:  Recent Labs     11/27/20  1435   WBC 11.5*   RBC 5.03   HEMOGLOBIN 15.7   HEMATOCRIT 48.1   MCV 95.6   MCH 31.2   MCHC 32.6*   RDW 43.2   PLATELETCT 266   MPV 9.5     Recent Labs     11/27/20  1435 11/27/20  1715   SODIUM 138  --    POTASSIUM 2.9* 4.4   CHLORIDE 102  --    CO2 23  --    GLUCOSE 226*  --    BUN 14  --    CREATININE 0.99  --    CALCIUM 9.2  --      Recent Labs     11/27/20  1435   APTT 25.4   INR 0.96     Glucose:  Recent Labs     11/27/20  1435   GLUCOSE 226*     Coags:  Recent Labs     11/27/20  1435   INR 0.96         Urinalysis:   No results for input(s): COLORURINE, CLARITY, SPECGRAVITY, PHURINE, GLUCOSEUR, KETONES, NITRITE, OCCULTBLOOD, RBCURINE, BACTERIA, EPITHELCELL in the last 72 hours.    Invalid input(s): BILRUBINUR, LEUESTERASE    Imaging:  DX-CHEST-PORTABLE (1 VIEW)   Final Result         Intubated.      Diffuse interstitial prominence could relate to mild fluid overload or atypical infection.      CT-HEAD W/O    (Results Pending)            Assessment/Recommendation   50 y.o. with urinary retention and traumatic del real catheter placement. The patient requires cystoscopy, possible dilation, possible supra-pubic tube placement. He has >1L per report by US.     After cystoscopy severe false passage and unable to pass catheter retrograde, no urethral lumen. He has high risk for urethral stricture and will require voiding cystourethrogram after he recovers.   -SPT exchange in 1 month, by urology only.    · Please see below procedure op report  · Supra-pubic tube placed, 18fr, tied/secured with 2-0 prolene, drained 1L   · Urology only to exchange catheter in 1 month, he will require         Pre-operative Diagnosis: 1. Urinary  retention  2. Traumatic del real catheter   Post-operative Diagnosis: Same as above  3. Urethral trauma and false passage  4. Urethral bleeding and urethral injury   Procedure: 1. Urethroscopy  2. Percutaneous supra-pubic placement (cystotomy)     Surgeon Jhonatan Velarde M.D., MD   Assistant: None   Anesthesia: LMA, General - patient already intubated by ED   Estimated Blood Loss: minimal       Specimens: 1. none   Drains: 18F supra-pubic catheter with 10cc in balloon   Wound class II clean contaminated   Condition and complications Stable, procedure well tolerated without complications   Disposition:  To ICU, will need urology to exchange supra pubic catheter in 1 month   Findings: 1. Urethroscopy finding:  The penile and distal bulbar urethra were normal caliber without any injury.  At the mid to proximal bulbar urethra there is a large ventral false passage and large blood with active bleeding with the limited visibility.  I was unable to find the true lumen as it had likely been obliterated from the urethral Del Real catheter trauma by the emergency room nurses.  After attempting to pass a wire retrograde multiple times the decision was made to abort this and place a percutaneous suprapubic tube.    18 Thai suprapubic tube was placed 2 cm above the pubic bone using a Terry suprapubic tube introducer.  This drained 1 L of clear yellow urine.    Covid positive patient       Indications for Procedure:    This patient has urinary retention, The penile and distal bulbar urethra were normal caliber without any injury.  At the mid to proximal bulbar urethra there is a large ventral false passage and large blood with active bleeding with the limited visibility.  I was unable to find the true lumen as it had likely been obliterated from the urethral Del Real catheter trauma by the emergency room nurses.  After attempting to pass a wire retrograde multiple times the decision was made to abort this and place a percutaneous  suprapubic tube.    Consent was not obtained due to the patient's sedation and there was no family members at bedside.    The patient was prepped with Betadine and blue towels were used to drape the field.  I used a 16 [place]au[place] flexible cystoscope to perform ureteroscopy.  After 20 minutes of attempting to visualize the true lumen this was deemed futile.  Please see above findings.       Procedure in Detail:  The patient was prepped with Betadine and blue towels were used to drape the field.  I used a 16 [place] flexible cystoscope to perform ureteroscopy.  After 20 minutes of attempting to visualize the true lumen this was deemed futile.       Please see above cystoscopy findings.     The bladder was palpable and very full.     With the bladder tight the skin 2 cm above the pubis in the midline was incised 1.5cm with scalpel. Spinal needle was then used to  the trajectory of the introducer and the bladder was aspirated, clear yellow urine. Using the Terry suprapubic tube percutaneous introducer we entered the bladder.  18 [place] tip catheter was then placed within the bladder.  The introducer was removed.  10 cc of water was placed into the balloon.  The catheter was secured with a 2-0 prolene single stitch.  This irrigated appropriately.  The bladder was emptied and this concluded our portion of the case.     Blood loss was mininimal.       Jhonatan Velarde MD  [phone]

## 2020-11-28 NOTE — CONSULTS
Surgery Neurosurgery Consultation    Date of Service  11/27/2020    Referring Physician  Bar Schmidt M.D.    Consulting Physician  Brandon Borja M.D.    Reason for Consultation  Acute obstructive Hydrocephalus, Cerebellar hypertensive hemorrhage, cerebral herniation    History of Presenting Illness  50 y.o. male who presented 11/27/2020 with respiratory failure earlier today.  He had a head CT which showed a large, 5 cm left cerebellar hemorrhage with interventricular hemorrhage, acute hydrocephalus and cerebral herniation.  Dr. Schmidt called me as soon as he was notified.  I was able to assess the patient within 5 minutes.  He last received rocuronium around 1400 today.  He is currently on a propofol drip at 25 mcg/kg/hour.  Apparently, earlier today he was found obtunded by his son at home.    Review of Systems  Review of Systems   Unable to perform ROS: Mental acuity       Past Medical History   has no past medical history on file.    Surgical History   has no past surgical history on file.    Family History  family history is not on file.    Social History       Medications  None       Allergies  Not on File    Physical Exam  Temp:  [36.7 °C (98 °F)] 36.7 °C (98 °F)  Pulse:  [] 56  Resp:  [19-39] 25  BP: ()/() 106/80  SpO2:  [96 %-100 %] 100 %    Physical Exam  Vitals signs reviewed.   Constitutional:       Comments: Intubated   HENT:      Head: Normocephalic and atraumatic.   Eyes:      Comments: Pupils are pinpoint and nonreactive, no corneal reflex, no oculocephalic reflex   Neurological:      Comments: GCS 3 T, Pupils are pinpoint and nonreactive, no corneal reflex, no oculocephalic reflex, no gag, positive cough reflex.         Fluids  Date 11/27/20 0700 - 11/28/20 0659   Shift 4470-0710 2532-2510 7489-3296 24 Hour Total   INTAKE   I.V.  1000  1000   Shift Total  1000  1000   OUTPUT   Shift Total       Weight (kg) 81.6 81.6 81.6 81.6       Laboratory  Recent Labs     11/27/20  4026    WBC 11.5*   RBC 5.03   HEMOGLOBIN 15.7   HEMATOCRIT 48.1   MCV 95.6   MCH 31.2   MCHC 32.6*   RDW 43.2   PLATELETCT 266   MPV 9.5     Recent Labs     11/27/20  1435 11/27/20  1715   SODIUM 138  --    POTASSIUM 2.9* 4.4   CHLORIDE 102  --    CO2 23  --    GLUCOSE 226*  --    BUN 14  --    CREATININE 0.99  --    CALCIUM 9.2  --      Recent Labs     11/27/20  1435   APTT 25.4   INR 0.96          Recent Labs     11/27/20  1435   TRIGLYCERIDE 212*        Imaging  CT-HEAD W/O   Final Result      1.  Cerebellar hemorrhage with extension into the fourth, third, and lateral ventricles developing hydrocephalus.      2.  Mildly effaced sulci are suggestive of more diffuse cerebral edema            Comment: Results discussed with Dr. Schmidt at approximately 8:18 PM         DX-CHEST-PORTABLE (1 VIEW)   Final Result         Intubated.      Diffuse interstitial prominence could relate to mild fluid overload or atypical infection.      DX-CHEST-PORTABLE (1 VIEW)    (Results Pending)       Assessment/Plan  This is an unfortunate 50-year-old male who suffered a large left cerebellar hypertensive hemorrhage, intraventricular hemorrhage and hydrocephalus leading to his respiratory failure.  The only brainstem reflex he has at this point is cough reflex.  He does not over breathe the ventilator.  His pupils are pinpoint, consistent with pontine pupils.  I was able to speak with the patient's Wife and son Paulo.  I have explained to the patient's son that the patient is in a deep coma, he only has one brainstem reflex and no other apparent neurologic function.  He does not have any medications confounding his exam.  At this point, there is no meaningful surgical or medical intervention that might have any reasonable expectation of helping the patient survive.  Son agrees that CPR should not be initiated.  Comfort measures are appropriate.

## 2020-11-28 NOTE — ASSESSMENT & PLAN NOTE
Large intracranial hemorrhage, 5.5 cm  Reviewed with Dr Borja neurosurgery  Non survivable  Keep sbp less than 140  Weaned off sedation   q 1 hour neuro checks

## 2020-11-28 NOTE — PROGRESS NOTES
Patient with asystole at 0035. Death pronounced by writer and another RN.  notified patient's son.  at bedside. Will call family to  patients belongings.

## 2020-11-29 LAB
BACTERIA UR CULT: NORMAL
SIGNIFICANT IND 70042: NORMAL
SITE SITE: NORMAL
SOURCE SOURCE: NORMAL

## 2020-11-29 NOTE — DISCHARGE SUMMARY
Death Summary    Cause of Death  Aspiration into airway due to respiratory failure with hypoxia due to intracranial hemorrhage due to COVID 19 due to unknown etiology.    Comorbid Conditions at the Time of Death  Active Problems:    Aspiration into airway POA: Unknown    Acute respiratory failure with hypoxia (HCC) POA: Unknown    Intracranial hemorrhage (HCC) POA: Unknown    Vomiting POA: Unknown    Altered mental status POA: Unknown    Hypokalemia POA: Unknown    Pneumonia due to COVID-19 virus POA: Unknown    Hematuria POA: Unknown  Resolved Problems:    * No resolved hospital problems. *      History of Presenting Illness and Hospital Course  This is a 50 y.o. male admitted 11/27/2020 with being found unresponsive at home by a family member.  He had respiratory failure with hypoxia secondary to aspiration into airway and required intubation for mechanical ventilation.  Subsequently had bleeding from traumatic placement of a del real catheter that required placement of suprapubic catheter after cystoscopy in ER unable to stop bleeding.  On CT of the head he had a large intracranial hemorrhage in the cerebellar region with bleeding into his ventricles and hydrocephalus present.  Per family patient not on any medications at home and no significant medical history.  He does drink alcohol but not significant amount or daily use.  Diagnosed with COVID 19 in ER, unclear if related to intracranial hemorrhage.  Intracranial hemorrhage not amenable to intervention per neurosurgery and considered non-survivable.  Discussed poor prognosis and goals of care with family and family made patient comfort care.  Patient passed after withdrawal of medical therapy.      Death Date: 11/28/20   Death Time: 0035       Pronounced By (RN1): Hermes ALMANZA RN  Pronounced By (RN2): Lavonne AL RN

## 2020-11-30 NOTE — DOCUMENTATION QUERY
"                                                                         [facility]                                                                       Query Response Note      PATIENT:               KATT OLMSTEAD  ACCT #:                  [id number]  MRN:                     [id number]  :                      1970  ADMIT DATE:       2020 2:09 PM  DISCH DATE:        2020 12:35 AM  RESPONDING  PROVIDER #:        [id number]           QUERY TEXT:    It is unclear whether Sepsis was present on admission. Please clarify the POA status.    The patient's Clinical Indicators include:  Sepsis is noted in the Assessment & Plan Note on 20 but was not addressed in the Pulmonary Consult note serving as the H&P for admission. 0/4 SIRS Criteria POA. Procalcitonin: < 0.05 on admission. SARS-CoV-2 detected by PCR on admission. \"Diffuse interstitial prominence could relate to mild fluid overload or atypical infection\" noted in CXR on admission.     Treatments include: Rocephin and Intubation w/Mechanical Ventilation.    Risk factors include: dx Acute Respiratory Failure 2/2 COVID-19 w/Viral PNA, dx Non-traumatic ICH/IVH, dx Hydrocephalus, and dx Cerebral Herniation.  Options provided:   -- Sepsis 2/2 COVID-19 w/Viral PNA was present on admission   -- Sepsis 2/2 other infection was present on admission, Please specify the causative disease process and/or organism(s) involved   -- Sepsis was not present on admission   -- Unable to determine      Query created by: Humphrey Winkler on 2020 10:40 AM    RESPONSE TEXT:    Sepsis 2/2 COVID-19 w/Viral PNA was present on admission          Electronically signed by:  CARELE HERNANDEZ MD 2020 10:43 AM              "

## 2020-12-01 LAB
BACTERIA BLD CULT: ABNORMAL
BACTERIA BLD CULT: ABNORMAL
SIGNIFICANT IND 70042: ABNORMAL
SITE SITE: ABNORMAL
SOURCE SOURCE: ABNORMAL

## 2020-12-01 NOTE — DISCHARGE PLANNING
Medical Social Work    LSW received call from pt's family member Mary ([phone]) stating that her uncle passed away on 2020 and they have chosen [facility] in [place]. LSW contacted NAM line and left a VM for Shanda regarding family's mortuary choice.

## 2020-12-02 LAB
BACTERIA BLD CULT: NORMAL
SIGNIFICANT IND 70042: NORMAL
SITE SITE: NORMAL
SOURCE SOURCE: NORMAL

## 2020-12-02 NOTE — DISCHARGE PLANNING
LSW was informed that pt was transferred to the on-call mortuary, and the on-call mortuary was notified of family's mortuary choice of [facility] in [place]. LSW contacted family and provided update.